# Patient Record
Sex: FEMALE | Race: ASIAN | Employment: FULL TIME | URBAN - METROPOLITAN AREA
[De-identification: names, ages, dates, MRNs, and addresses within clinical notes are randomized per-mention and may not be internally consistent; named-entity substitution may affect disease eponyms.]

---

## 2020-01-08 ENCOUNTER — HOSPITAL ENCOUNTER (OUTPATIENT)
Dept: PHYSICAL THERAPY | Age: 55
Discharge: HOME | End: 2020-01-08
Payer: COMMERCIAL

## 2020-01-08 PROCEDURE — 97162 PT EVAL MOD COMPLEX 30 MIN: CPT

## 2020-01-08 PROCEDURE — 97110 THERAPEUTIC EXERCISES: CPT

## 2020-01-08 NOTE — PROGRESS NOTES
Mindy Cardona  : 1965  Primary: Brent Morin Of [facility]*  Secondary:  Therapy Center at [address], [address], [address] [facility]  [phone]   [phone]        OUTPATIENT PHYSICAL THERAPY: Daily Treatment Note 2020  Visit Count:  1    ICD-10: Treatment Diagnosis: low back pain M54.5  Precautions/Allergies:   Patient has no allergy information on record. TREATMENT PLAN:  Effective Dates: 2020 TO 3/8/2020 (60 days). Frequency/Duration: 2 times a week for 60 Day(s)    Pre-treatment Symptoms/Complaints:  Abdominal pain  Pain: Initial: Pain Intensity 1: (0-8/10)  Post Session:  5/10   Medications Last Reviewed:  2020  Updated Objective Findings:  See evaluation note from today  TREATMENT:     THERAPEUTIC EXERCISE: (13 minutes):  Exercises per grid below to improve mobility and strength. Required moderate visual, verbal, manual and tactile cues to promote proper body alignment, promote proper body posture and promote proper body mechanics. Progressed resistance, range, repetitions and complexity of movement as indicated. Date:  20 Date:   Date:     Activity/Exercise Parameters Parameters Parameters   Scar tissue massage 5 min     TA activation 5 min                                       [facility]  Treatment/Session Summary:    · Response to Treatment:  Pt would benefit from skilled therapy to address the aforementioned deficits that limit functional ability to return to previous level of function. .  · Communication/Consultation:  eval sent to referring physician  · Equipment provided today:  HEP  · Recommendations/Intent for next treatment session: Next visit will focus on progressing TA strength.     Total Treatment Billable Duration:  13 minutes  PT Patient Time In/Time Out  Time In: 930  Time Out: 2 Albert Ruiz PT    Future Appointments   Date Time Provider Vinicio Jerry   2020 10:15 AM Mo Stern JOSE, BIRDIE SFEORPT SFE

## 2020-01-08 NOTE — THERAPY EVALUATION
Adam Romero  : 1965  Primary: Soumya Micheal [facility] Rehan*  Secondary:  Therapy Center at [place] [id number] [facility], [address] [place] [address],[address], [facility] [address]  [phone]   [phone]          OUTPATIENT PHYSICAL THERAPY:Initial Assessment 2020   ICD-10: Treatment Diagnosis: low back pain M54.5  Precautions/Allergies:   Patient has no allergy information on record. TREATMENT PLAN:  Effective Dates: 2020 TO 3/8/2020 (60 days). Frequency/Duration: 2 times a week for 60 Day(s) MEDICAL/REFERRING DIAGNOSIS:  Low back pain [M54.5]     DATE OF ONSET: 2019  REFERRING PHYSICIAN: Jose Zamarripa MD MD Orders: eval and treat  Return MD Appointment: TBD     INITIAL ASSESSMENT:  Ms. Anthony Adhikari presents with scar tissue adhesions at incision site of previous hysterectomy and inability to activate TA for functional lifting, reaching and bending. Pt would benefit from skilled therapy to address these deficits for return to previous level of function. PROBLEM LIST (Impacting functional limitations):  1. Decreased Strength  2. Decreased ADL/Functional Activities  3. Decreased Transfer Abilities  4. Increased Pain  5. Decreased Flexibility/Joint Mobility  6. Decreased [place] with Home Exercise Program INTERVENTIONS PLANNED: (Treatment may consist of any combination of the following)  1. Cold  2. Electrical Stimulation  3. Heat  4. Home Exercise Program (HEP)  5. Manual Therapy  6. Range of Motion (ROM)  7. Therapeutic Exercise/Strengthening     GOALS: (Goals have been discussed and agreed upon with patient.)  Short-Term Functional Goals: Time Frame: 4 weeks  1. Pt will be I with HEP to allow for continued progress with symptom management. 2. Pt will demonstrate 10 second TA isolation. Discharge Goals: Time Frame: 8 weeks  1. Pt will improve СВЕТЛАНА score to <11 to reflect an improvement in function.    2. Pt will improve c/o pain to 2/10 to allow for improved tolerance for return to previous level of function. 3. Pt will improve TA strength to 5-/5 for 30 second hold to improve stability with functional activities. 4. Pt will demonstrate 40 pound lifting for 5 repetitions with proper body mechanics and muscle activation. 5. Pt will report improved tolerance for work duties to full time full duty. OUTCOME MEASURE:   Tool Used: Modified Oswestry Low Back Pain Questionnaire  Score:  Initial: 15/50  Most Recent: X/50 (Date: -- )   Interpretation of Score: Each section is scored on a 0-5 scale, 5 representing the greatest disability. The scores of each section are added together for a total score of 50. MEDICAL NECESSITY:   · Patient is expected to demonstrate progress in strength and range of motion to return to previous level of function. REASON FOR SERVICES/OTHER COMMENTS:  · Patient continues to require present interventions due to patient's inability to lift greater than 15 minutes. .  Total Duration:  PT Patient Time In/Time Out  Time In: 0930  Time Out: 1015    Rehabilitation Potential For Stated Goals: Good  Regarding Mamie Fearing therapy, I certify that the treatment plan above will be carried out by a therapist or under their direction. Thank you for this referral,  Satinder Olvera PT     Referring Physician Signature: Francesca Glasgow MD _______________________________ Date _____________     PAIN/SUBJECTIVE:   Initial: Pain Intensity 1: (0-8/10)  Post Session:  5/10   HISTORY:   History of Injury/Illness (Reason for Referral):  Pt is a 47 y.o. female presenting to PT with low back pain. 2 years ago she had a hysterectomy. 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 Had to work in the heavy lifting area occasionally and felt that she could not do it because of the pain from surgery. When she cuts open boxes she also has pain. No pain if she does not lift heavy items. Limited to about 15 minutes lifting. After the hysterectomy she moved to [place] so she did not have a follow up with her doctor. Her doctor has limited her to no greater than 15 pounds lifting at work. Describes her pain as both painful and hot at the same time. States that the pain is located to the left of her scar and is not very deep. Pain began in July 2019. Past Medical History/Comorbidities:   Ms. Jose Pete  has no past medical history on file. Ms. Jose Pete  has no past surgical history on file. Social History/Living Environment:     unknown  Prior Level of Function/Work/Activity:  Lifts up to 50 pounds at work for 10 hour day     Ambulatory/Rehab Services H2 Model Falls Risk Assessment   Risk Factors:       No Risk Factors Identified Ability to Rise from Chair:       (0)  Ability to rise in a single movement   Falls Prevention Plan:       No modifications necessary   Total: (5 or greater = High Risk): 0   ©2010 [facility] [address] [place] [address] States Patent #[id number]. Federal Law prohibits the replication, distribution or use without written permission from [facility] [employer]   Current Medications:     No current outpatient medications on file. Date Last Reviewed:  1/8/2020    Number of Personal Factors/Comorbidities that affect the Plan of Care: 1-2: MODERATE COMPLEXITY   EXAMINATION:   Observation/Orthostatic Postural Assessment:          Posture: WNL    Palpation:          Scar tissue adhesions noted at incision site  ROM:            LUMBAR SPINE    AROM    Flexion   Extension  Right Rotation  Left Rotation  Right Sidebend  Left Sidebend WNL  WNL  WNL  WNL  WNL  WNL       Strength:          LE-  WNL  TA-  Unable to illicit activation     Functional Mobility:         Bed Mobility:  WNL       Body Structures Involved:  1. Joints  2. Muscles Body Functions Affected:  1. Sensory/Pain  2. Movement Related Activities and Participation Affected:  1.  General Tasks and Demands   Number of elements (examined above) that affect the Plan of Care: 3: MODERATE COMPLEXITY   CLINICAL PRESENTATION:   Presentation: Evolving clinical presentation with changing clinical characteristics: MODERATE COMPLEXITY   CLINICAL DECISION MAKING:   Use of outcome tool(s) and clinical judgement create a POC that gives a: Questionable prediction of patient's progress: MODERATE COMPLEXITY

## 2020-01-13 ENCOUNTER — HOSPITAL ENCOUNTER (OUTPATIENT)
Dept: PHYSICAL THERAPY | Age: 55
Discharge: HOME | End: 2020-01-13
Payer: COMMERCIAL

## 2020-01-13 PROCEDURE — 97140 MANUAL THERAPY 1/> REGIONS: CPT

## 2020-01-13 PROCEDURE — 97110 THERAPEUTIC EXERCISES: CPT

## 2020-01-13 NOTE — PROGRESS NOTES
Norman Cary  : 1965  Primary: Levy Armstrong [facility]*  Secondary:  Therapy Center at [address], [address], [address] [place] [address]  [phone]   [phone]        OUTPATIENT PHYSICAL THERAPY: Daily Treatment Note 2020  Visit Count:  2    ICD-10: Treatment Diagnosis: low back pain M54.5  Precautions/Allergies:   Patient has no allergy information on record. TREATMENT PLAN:  Effective Dates: 2020 TO 3/8/2020 (60 days). Frequency/Duration: 2 times a week for 60 Day(s)    Pre-treatment Symptoms/Complaints:  Abdominal pain \"If I don't lift I don't hurt, if I lift for a period of time the pain goes up\". Pain: Initial:   0/10 with no lifting up to one hour it increases 5-6/10 Post Session:  5/10   Medications Last Reviewed:  2020  Updated Objective Findings:  none today  TREATMENT:     THERAPEUTIC EXERCISE: (25 minutes):  Exercises per grid below to improve mobility and strength. Required moderate visual, verbal, manual and tactile cues to promote proper body alignment, promote proper body posture and promote proper body mechanics. Progressed resistance, range, repetitions and complexity of movement as indicated. MANUAL THERAPY: (20 minutes): Soft tissue mobilization and scar massage and low back myofascial to decrease tightness and overall pain. was utilized and necessary because of the patient's restricted joint motion, loss of articular motion and restricted motion of soft tissue.     Date:  20 Date:   Date:     Activity/Exercise Parameters Parameters Parameters   Scar tissue massage 5 min     TA activation 5 min     SKTC 3 x 15 sec hold bilaterally     Lower Trunk Rotation X 20 each way     Pelvic Tilt X 15 reps 3 sec pause     Bridging  X 15 reps      Clam shell with TA Supine x 15 reps each     SLR with TA X 10 reps bilaterally                     [facility] Portal  Treatment/Session Summary:    · Response to Treatment:  Patient was able to complete tx with minimal abdominal pain. She has [facility] · Communication/Consultation:  eval sent to referring physician  · Equipment provided today:  HEP  · Recommendations/Intent for next treatment session: Next visit will focus on progressing TA strength.     Total Treatment Billable Duration: 45 minutes  PT Patient Time In/Time Out  Time In: 1015  Time Out: 656 [place], [place]    Future Appointments   Date Time Provider Vinicio Jerry   1/16/2020  1:00 PM JENNIFER MEDRANO SFOORPT [facility]

## 2020-01-15 ENCOUNTER — HOSPITAL ENCOUNTER (OUTPATIENT)
Dept: PHYSICAL THERAPY | Age: 55
End: 2020-01-15
Payer: COMMERCIAL

## 2020-01-16 ENCOUNTER — HOSPITAL ENCOUNTER (OUTPATIENT)
Dept: PHYSICAL THERAPY | Age: 55
Discharge: HOME | End: 2020-01-16
Payer: COMMERCIAL

## 2020-01-16 PROCEDURE — 97750 PHYSICAL PERFORMANCE TEST: CPT

## 2020-01-17 NOTE — THERAPY EVALUATION
Willard Peres  : 1965  Primary: Harsha Ruvalcaba [facility] [place] [facility]*  Secondary:  Therapy Center at [facility]  [address] , [address], [place] [address]  [phone]   [phone]      OUTPATIENT PHYSICAL THERAPY: FUNCTIONAL CAPACITY EVALUATION    ICD-10: Treatment Diagnosis: Back Pain (M54.5)  REFERRING PHYSICIAN:  Elio Mcarthur MD MD Orders: FCE  Return Physician Appointment: 20  MEDICAL/REFERRING DIAGNOSIS: back pain  DATE OF ONSET: 2019   PRIOR LEVEL OF FUNCTION: independent  PRECAUTIONS/ALLERGIES: Patient has no allergy information on record. Ambulatory/Rehab Services H2 Model Falls Risk Assessment    Risk Factors:       No Risk Factors Identified Ability to Rise from Chair:       (0)  Ability to rise in a single movement    Falls Prevention Plan:       No modifications necessary   Total: (5 or greater = High Risk): 0     [facility] [place]. All Rights Reserved. [facility] [employer] Patent #[id number]. Federal Law prohibits the replication, distribution or use without written permission from [facility] 57:?????? ? ? This section established at most recent assessment??????????  Willard Peres completes FCE testing today with the following results:    PURPOSE OF ASSESSMENT  Ms. Agueda Noel was referred for a Functional Capacity Evaluation to determine her ability to perform the duties of her occupation as an [address] . Ms. Agueda Noel has been referred with the diagnosis of back pain. She was present for evaluation on 2020 for a period of 2 hours and 10 minutes. RELIABILITY AND CONSISTENCY OF EFFORT  The results of this evaluation suggest that Ms. Agueda Noel gave a reliable effort, with 29 consistency measures yielding a reliability score of 56 out of 64 (88%).       FUNCTIONAL ABILITIES  Evaluee's demonstrated abilities meet essential job demands for the following activities: Mid Lift, Carrying, Pushing, Pulling, Sitting, Standing, Walking, Bending, Reach Immediate (Front)  Right, Reach Immediate (Front)  Left, Reach Overhead (Front)  Right, Reach Overhead (Front)  Left. FUNCTIONAL LIMITATIONS  Evaluee's demonstrated abilities do not meet essential job demands for the following activities: High Lift, Low Lift, Full Lift, Overall Strength. Reccommended Lift restrictions:  floor to waist - 15 lbs   knee to chest - 20 lbs    chest to overhead - 15 lbs   push - 25 lbs  pull- 20 lbs    CONCLUSIONS  Results of this evaluation are a reliable indicator of Ms. Humphries's ability to perform the above-listed work tasks. Her functional activities meet the essential job demands as listed above. Her strength levels for lifting fall below the 50 lbs listed in job demands. Above listed lift restrictions may be used for potential job placement. Please see pg 2 for graphic abilities. PLAN OF CARE:No further physical therapy follow-up is planned as orders were for FCE testing only. Regarding Edita Ramirez therapy, I certify that the treatment plan above will be carried out by a therapist or under their direction. Thank you for this referral,  Gwyn Lazcano PT                   SUBJECTIVE:Please see [employer] test results  OBJECTIVE:Please see [employer] test results  TREATMENT:    (In addition to Assessment/Re-Assessment sessions the following treatments were rendered)  FCE ( 120 min.) Patient completes FCE testing with the [employer] system.  Test report will be scanned into medical record.  ______________________________________________________________________________________________________  Total Treatment Duration: 120 min physical performance testing  PT Patient Time In/Time Out  Time In: 2775  Time Out: SOCO Malloy